# Patient Record
Sex: FEMALE | Race: WHITE | NOT HISPANIC OR LATINO | Employment: OTHER | URBAN - METROPOLITAN AREA
[De-identification: names, ages, dates, MRNs, and addresses within clinical notes are randomized per-mention and may not be internally consistent; named-entity substitution may affect disease eponyms.]

---

## 2017-02-01 ENCOUNTER — GENERIC CONVERSION - ENCOUNTER (OUTPATIENT)
Dept: OTHER | Facility: OTHER | Age: 62
End: 2017-02-01

## 2017-02-02 ENCOUNTER — GENERIC CONVERSION - ENCOUNTER (OUTPATIENT)
Dept: OTHER | Facility: OTHER | Age: 62
End: 2017-02-02

## 2017-02-03 ENCOUNTER — ALLSCRIPTS OFFICE VISIT (OUTPATIENT)
Dept: OTHER | Facility: OTHER | Age: 62
End: 2017-02-03

## 2017-02-14 ENCOUNTER — ANESTHESIA EVENT (OUTPATIENT)
Dept: PERIOP | Facility: HOSPITAL | Age: 62
End: 2017-02-14
Payer: COMMERCIAL

## 2017-02-28 ENCOUNTER — ANESTHESIA (OUTPATIENT)
Dept: PERIOP | Facility: HOSPITAL | Age: 62
End: 2017-02-28
Payer: COMMERCIAL

## 2017-02-28 ENCOUNTER — HOSPITAL ENCOUNTER (OUTPATIENT)
Facility: HOSPITAL | Age: 62
Setting detail: OUTPATIENT SURGERY
Discharge: HOME | End: 2017-02-28
Attending: ORTHOPAEDIC SURGERY | Admitting: ORTHOPAEDIC SURGERY
Payer: COMMERCIAL

## 2017-02-28 VITALS
BODY MASS INDEX: 42.51 KG/M2 | TEMPERATURE: 97.6 F | RESPIRATION RATE: 16 BRPM | DIASTOLIC BLOOD PRESSURE: 69 MMHG | HEART RATE: 90 BPM | WEIGHT: 249 LBS | SYSTOLIC BLOOD PRESSURE: 140 MMHG | HEIGHT: 64 IN | OXYGEN SATURATION: 98 %

## 2017-02-28 DIAGNOSIS — S83.209A ACUTE MENISCAL TEAR OF KNEE, UNSPECIFIED LATERALITY, INITIAL ENCOUNTER: Primary | ICD-10-CM

## 2017-02-28 LAB
ABO GROUP BLD: NORMAL
BLD GP AB SCN SERPL QL: NEGATIVE
INR PPP: 0.97 (ref 0.86–1.16)
PROTHROMBIN TIME: 12.9 SECONDS (ref 12–14.3)
RH BLD: POSITIVE

## 2017-02-28 PROCEDURE — 86850 RBC ANTIBODY SCREEN: CPT | Performed by: ORTHOPAEDIC SURGERY

## 2017-02-28 PROCEDURE — 86900 BLOOD TYPING SEROLOGIC ABO: CPT | Performed by: ORTHOPAEDIC SURGERY

## 2017-02-28 PROCEDURE — 86901 BLOOD TYPING SEROLOGIC RH(D): CPT | Performed by: ORTHOPAEDIC SURGERY

## 2017-02-28 PROCEDURE — 85610 PROTHROMBIN TIME: CPT | Performed by: ORTHOPAEDIC SURGERY

## 2017-02-28 RX ORDER — LIDOCAINE HYDROCHLORIDE 10 MG/ML
INJECTION, SOLUTION INFILTRATION; PERINEURAL AS NEEDED
Status: DISCONTINUED | OUTPATIENT
Start: 2017-02-28 | End: 2017-02-28 | Stop reason: HOSPADM

## 2017-02-28 RX ORDER — PROPOFOL 10 MG/ML
INJECTION, EMULSION INTRAVENOUS AS NEEDED
Status: DISCONTINUED | OUTPATIENT
Start: 2017-02-28 | End: 2017-02-28 | Stop reason: SURG

## 2017-02-28 RX ORDER — LIDOCAINE HYDROCHLORIDE 10 MG/ML
INJECTION, SOLUTION INFILTRATION; PERINEURAL AS NEEDED
Status: DISCONTINUED | OUTPATIENT
Start: 2017-02-28 | End: 2017-02-28 | Stop reason: SURG

## 2017-02-28 RX ORDER — SODIUM CHLORIDE 9 MG/ML
125 INJECTION, SOLUTION INTRAVENOUS CONTINUOUS
Status: DISCONTINUED | OUTPATIENT
Start: 2017-02-28 | End: 2017-02-28 | Stop reason: HOSPADM

## 2017-02-28 RX ORDER — CEFAZOLIN SODIUM 1 G/3ML
INJECTION, POWDER, FOR SOLUTION INTRAMUSCULAR; INTRAVENOUS AS NEEDED
Status: DISCONTINUED | OUTPATIENT
Start: 2017-02-28 | End: 2017-02-28 | Stop reason: SURG

## 2017-02-28 RX ORDER — MIDAZOLAM HYDROCHLORIDE 1 MG/ML
INJECTION INTRAMUSCULAR; INTRAVENOUS AS NEEDED
Status: DISCONTINUED | OUTPATIENT
Start: 2017-02-28 | End: 2017-02-28 | Stop reason: SURG

## 2017-02-28 RX ORDER — LIDOCAINE HYDROCHLORIDE AND EPINEPHRINE 20; 5 MG/ML; UG/ML
INJECTION, SOLUTION EPIDURAL; INFILTRATION; INTRACAUDAL; PERINEURAL AS NEEDED
Status: DISCONTINUED | OUTPATIENT
Start: 2017-02-28 | End: 2017-02-28 | Stop reason: SURG

## 2017-02-28 RX ORDER — ROPIVACAINE HYDROCHLORIDE 5 MG/ML
INJECTION, SOLUTION EPIDURAL; INFILTRATION; PERINEURAL AS NEEDED
Status: DISCONTINUED | OUTPATIENT
Start: 2017-02-28 | End: 2017-02-28 | Stop reason: SURG

## 2017-02-28 RX ORDER — FENTANYL CITRATE 50 UG/ML
INJECTION, SOLUTION INTRAMUSCULAR; INTRAVENOUS AS NEEDED
Status: DISCONTINUED | OUTPATIENT
Start: 2017-02-28 | End: 2017-02-28 | Stop reason: SURG

## 2017-02-28 RX ORDER — ALBUTEROL SULFATE 2.5 MG/3ML
2.5 SOLUTION RESPIRATORY (INHALATION) ONCE AS NEEDED
Status: DISCONTINUED | OUTPATIENT
Start: 2017-02-28 | End: 2017-02-28 | Stop reason: HOSPADM

## 2017-02-28 RX ORDER — HYDROCODONE BITARTRATE AND ACETAMINOPHEN 5; 325 MG/1; MG/1
1 TABLET ORAL EVERY 6 HOURS PRN
Status: DISCONTINUED | OUTPATIENT
Start: 2017-02-28 | End: 2017-02-28 | Stop reason: HOSPADM

## 2017-02-28 RX ORDER — MEPERIDINE HYDROCHLORIDE 50 MG/ML
12.5 INJECTION INTRAMUSCULAR; INTRAVENOUS; SUBCUTANEOUS AS NEEDED
Status: DISCONTINUED | OUTPATIENT
Start: 2017-02-28 | End: 2017-02-28 | Stop reason: HOSPADM

## 2017-02-28 RX ORDER — FENTANYL CITRATE/PF 50 MCG/ML
50 SYRINGE (ML) INJECTION
Status: DISCONTINUED | OUTPATIENT
Start: 2017-02-28 | End: 2017-02-28 | Stop reason: HOSPADM

## 2017-02-28 RX ORDER — BUPIVACAINE HYDROCHLORIDE AND EPINEPHRINE 2.5; 5 MG/ML; UG/ML
INJECTION, SOLUTION INFILTRATION; PERINEURAL AS NEEDED
Status: DISCONTINUED | OUTPATIENT
Start: 2017-02-28 | End: 2017-02-28 | Stop reason: HOSPADM

## 2017-02-28 RX ORDER — ONDANSETRON 2 MG/ML
4 INJECTION INTRAMUSCULAR; INTRAVENOUS EVERY 6 HOURS PRN
Status: DISCONTINUED | OUTPATIENT
Start: 2017-02-28 | End: 2017-02-28 | Stop reason: HOSPADM

## 2017-02-28 RX ORDER — ACETAMINOPHEN 325 MG/1
650 TABLET ORAL EVERY 6 HOURS PRN
Status: DISCONTINUED | OUTPATIENT
Start: 2017-02-28 | End: 2017-02-28 | Stop reason: HOSPADM

## 2017-02-28 RX ORDER — ONDANSETRON 2 MG/ML
INJECTION INTRAMUSCULAR; INTRAVENOUS AS NEEDED
Status: DISCONTINUED | OUTPATIENT
Start: 2017-02-28 | End: 2017-02-28 | Stop reason: SURG

## 2017-02-28 RX ADMIN — LIDOCAINE HYDROCHLORIDE 100 MG: 10 INJECTION, SOLUTION INFILTRATION; PERINEURAL at 07:25

## 2017-02-28 RX ADMIN — CEFAZOLIN 2000 MG: 1 INJECTION, POWDER, FOR SOLUTION INTRAVENOUS at 07:26

## 2017-02-28 RX ADMIN — DEXAMETHASONE SODIUM PHOSPHATE 4 MG: 10 INJECTION INTRAMUSCULAR; INTRAVENOUS at 07:45

## 2017-02-28 RX ADMIN — LIDOCAINE HYDROCHLORIDE AND EPINEPHRINE 20 ML: 20; 5 INJECTION, SOLUTION EPIDURAL; INFILTRATION; INTRACAUDAL; PERINEURAL at 07:15

## 2017-02-28 RX ADMIN — SODIUM CHLORIDE 125 ML/HR: 0.9 INJECTION, SOLUTION INTRAVENOUS at 06:22

## 2017-02-28 RX ADMIN — ROPIVACAINE HYDROCHLORIDE 30 ML: 5 INJECTION, SOLUTION EPIDURAL; INFILTRATION; PERINEURAL at 07:15

## 2017-02-28 RX ADMIN — ONDANSETRON HYDROCHLORIDE 4 MG: 2 INJECTION, SOLUTION INTRAVENOUS at 07:46

## 2017-02-28 RX ADMIN — SODIUM CHLORIDE: 0.9 INJECTION, SOLUTION INTRAVENOUS at 07:32

## 2017-02-28 RX ADMIN — FENTANYL CITRATE 100 MCG: 50 INJECTION, SOLUTION INTRAMUSCULAR; INTRAVENOUS at 07:13

## 2017-02-28 RX ADMIN — MIDAZOLAM HYDROCHLORIDE 2 MG: 1 INJECTION, SOLUTION INTRAMUSCULAR; INTRAVENOUS at 07:13

## 2017-02-28 RX ADMIN — PROPOFOL 200 MG: 10 INJECTION, EMULSION INTRAVENOUS at 07:25

## 2017-03-31 ENCOUNTER — GENERIC CONVERSION - ENCOUNTER (OUTPATIENT)
Dept: OTHER | Facility: OTHER | Age: 62
End: 2017-03-31

## 2018-01-11 NOTE — MISCELLANEOUS
Message   Recorded as Task   Date: 12/28/2016 01:47 PM, Created By: Anastasia Jaramillo   Task Name: Call Back   Assigned To: ELLIE GYN,Team   Regarding Patient: KIET LOPEZ, Status: In Progress   Comment:    Ronak Caaly - 28 Dec 2016 1:47 PM     TASK CREATED  pelvic ultrasound looks okay; endometrium is borderline at 5mm  I cannot be sure that her stopping HRT patches caused her bleed, so she will need an endometrial biopsy   [facility] - 28 Dec 2016 2:14 PM     TASK IN PROGRESS   [facility] - 28 Dec 2016 2:28 PM     TASK EDITED  Pt is in bad shape  She is on Eliquis and is not supposed to stop it  She might have knee surgery and still has the clot  Is going back to vascular doc on 1/27 and wants to wait till after then to have EB  I don't want to scare her - she wants to wait  What say you? Margy Caal - 28 Dec 2016 2:30 PM     TASK REPLIED TO: Previously Assigned To Margy Caal  that's ok   [facility] - 28 Dec 2016 2:49 PM     TASK EDITED   [facility] - 28 Dec 2016 2:59 PM     TASK EDITED  Pt will call abck in Feb  I also will have task return end of Ricardo NavaWaltJie - 31 Jan 2017 8:12 AM     TASK EDITED   [facility] - 01 Feb 2017 8:14 AM     TASK EDITED  [facility] - 01 Feb 2017 8:14 AM     TASK IN PROGRESS   [facility] - 01 Feb 2017 9:04 AM     TASK EDITED  Pt is now going to have knee replacement  But is switching docs so it might not be for a while  Pt on eliquis and will remain on it till ff knee surgery  She has not had any more  bleeding  Can an EB be done when pt on eliquis? What to do???   Margy Caal - 01 Feb 2017 9:57 AM     TASK REPLIED TO: Previously Assigned To Margy Caal  yes it can be done while on eliquis   [facility] - 01 Feb 2017 11:22 AM     TASK EDITED  [facility] - 01 Feb 2017 3:47 PM     TASK EDITED  I gave pt apt for an eb  I gave 40 min for apt as pt has mobility problems          Active Problems    1  Encounter for gynecological examination without abnormal finding (V72 31) (Z01 419)   2  Encounter for screening mammogram for malignant neoplasm of breast (V76 12)   (Z12 31)   3  Hot flashes (782 62) (R23 2)   4  Menopause (627 2)   5  Palpitations (785 1) (R00 2)   6  PMB (postmenopausal bleeding) (627 1) (N95 0)    Current Meds   1  Calcium Citrate + D 315-250 MG-UNIT Oral Tablet; Therapy: (Recorded:15Jan2014) to Recorded   2  Climara Pro 0 045-0 015 MG/DAY Transdermal Patch Weekly; APPLY 1 PATCH WEEKLY   AS DIRECTED; Therapy: 91CAL2312 to ()  Requested for: 25Oct2016; Last   Rx:25Oct2016 Ordered   3  Fish Oil CAPS; Therapy: (Recorded:15Jan2014) to Recorded   4  Multi-Vitamin TABS; Therapy: (Recorded:15Jan2014) to Recorded    Allergies    1  EPINEPHrine SOLN    Signatures   Electronically signed by :  Bernadine Cummins, ; Feb 1 2017  3:48PM EST                       (Author)

## 2018-01-12 NOTE — MISCELLANEOUS
Message  please note patient stated to me initially that she used the patch 2xs weekly for only one week, when I spoke to her again she then said "well the thing is I now need refills because I didn't really only use it for one week, I am a month short of patches-I actually used it for 2 weeks" I advised we will not refill until she comes in for discussion and I absolutely advise she is TO NOT use more than 1 x weekly  pt understood and apt was set up for friday  Active Problems    1  Encounter for gynecological examination without abnormal finding (V72 31) (Z01 419)   2  Encounter for screening mammogram for malignant neoplasm of breast (V76 12)   (Z12 31)   3  Hot flashes (782 62) (R23 2)   4  Menopause (627 2)   5  Palpitations (785 1) (R00 2)    Current Meds   1  Calcium Citrate + D 315-250 MG-UNIT Oral Tablet; Therapy: (Recorded:15Jan2014) to Recorded   2  Climara Pro 0 045-0 015 MG/DAY Transdermal Patch Weekly; APPLY 1 PATCH WEEKLY   AS DIRECTED; Therapy: 93FXD1463 to (96 [id number])  Requested for: 25Oct2016; Last   Rx:25Oct2016 Ordered   3  CombiPatch 0 05-0 14 MG/DAY Transdermal Patch Twice Weekly; one patch twice   weekly; Therapy: 63NIO8062 to (Evaluate:16Oct2017)  Requested for: 21Oct2016; Last   Rx:21Oct2016 Ordered   4  CombiPatch 0 05-0 14 MG/DAY Transdermal Patch Twice Weekly; USE AS DIRECTED; Therapy: 23ERG1199 to (Christine Iqbal)  Requested for: 07YQI9823; Last   Rx:11Nov2016 Ordered   5  Fish Oil CAPS; Therapy: (Recorded:15Jan2014) to Recorded   6  Multi-Vitamin TABS; Therapy: (Recorded:15Jan2014) to Recorded    Allergies    1  EPINEPHrine SOLN    Plan  Palpitations    · (1) TSH; Status:Active - Retrospective By Protocol Authorization;  Requested  for:79Gtx5804;     Signatures   Electronically signed by : Rena Gabriel LPN; Dec  2 7905  5:46VX EST                       (Author)

## 2018-01-12 NOTE — MISCELLANEOUS
Message   Recorded as Task   Date: 11/11/2016 12:26 PM, Created By: Junito Lewis   Task Name: Call Back   Assigned To: ELLIE GYN,Team   Regarding Patient: KIET LOPEZ, Status: In Progress   Corazon Barrett - 11 Nov 2016 12:26 PM     TASK CREATED  pt called c/o the hormone patch that she is on isn't working  [address] [employer] 11 Nov 2016 12:48 PM     TASK IN PROGRESS   MikiJan - 11 Nov 2016 12:58 PM     TASK EDITED              returned pts' p c - states "climiara patch is not working for her" her chief complaint is heart palpatations & hot flashes"  is there something else she can use? please advise        Active Problems    1  Encounter for gynecological examination without abnormal finding (V72 31) (Z01 419)   2  Encounter for screening mammogram for malignant neoplasm of breast (V76 12)   (Z12 31)   3  Hot flashes (782 62) (R23 2)   4  Menopause (627 2)   5  Palpitations (785 1) (R00 2)    Current Meds   1  Calcium Citrate + D 315-250 MG-UNIT Oral Tablet; Therapy: (Recorded:15Jan2014) to Recorded   2  Climara Pro 0 045-0 015 MG/DAY Transdermal Patch Weekly; APPLY 1 PATCH WEEKLY   AS DIRECTED; Therapy: 94OES3667 to (21 )  Requested for: 25Oct2016; Last   Rx:25Oct2016 Ordered   3  CombiPatch 0 05-0 14 MG/DAY Transdermal Patch Twice Weekly; one patch twice   weekly; Therapy: 47WQV3288 to (Evaluate:16Oct2017)  Requested for: 21Oct2016; Last   Rx:21Oct2016 Ordered   4  Fish Oil CAPS; Therapy: (Recorded:15Jan2014) to Recorded   5  Multi-Vitamin TABS; Therapy: (Recorded:15Jan2014) to Recorded    Allergies    1   EPINEPHrine SOLN    Signatures   Electronically signed by : Makayla Shoemaker RN; Nov 11 2016 12:58PM EST                       (Author)

## 2018-01-12 NOTE — MISCELLANEOUS
Message   Recorded as Task   Date: 12/08/2016 12:10 PM, Created By: Awais Colon   Task Name: Call Back   Assigned To: ELLIE GYN,Team   Regarding Patient: KIET LOPEZ, Status: In Progress   Logan Centeno - 08 Dec 2016 12:10 PM     TASK CREATED  Caller: Self; [phone] (Home); [phone] (Work)  pt called - she said she had to cancel her appt for tomorrow with ann and she would like for letty to call her back  [phone]   Jie Moore - 08 Dec 2016 1:27 PM     TASK IN PROGRESS   Jie Moore - 08 Dec 2016 1:44 PM     TASK EDITED  spoke with pt    she wants ann to [facility] that she fell down her steps, and iis in a wheelchair with her knee stabalized    she is out of work for 1 month    she is stilll complaining that   climara does not give her medication for the full week    adv pt to use the climara for a month and resched apt with ann when she has recovered    still awaiting tsh result which she just ahd done at [facility] lab    she will call and have result faxed  fyi to ann        Active Problems    1  Encounter for gynecological examination without abnormal finding (V72 31) (Z01 419)   2  Encounter for screening mammogram for malignant neoplasm of breast (V76 12)   (Z12 31)   3  Hot flashes (782 62) (R23 2)   4  Menopause (627 2)   5  Palpitations (785 1) (R00 2)    Current Meds   1  Calcium Citrate + D 315-250 MG-UNIT Oral Tablet; Therapy: (Recorded:15Jan2014) to Recorded   2  Climara Pro 0 045-0 015 MG/DAY Transdermal Patch Weekly; APPLY 1 PATCH WEEKLY   AS DIRECTED; Therapy: 56SGI1945 to (96 [id number])  Requested for: 25Oct2016; Last   Rx:25Oct2016 Ordered   3  CombiPatch 0 05-0 14 MG/DAY Transdermal Patch Twice Weekly; one patch twice   weekly; Therapy: 43DWB6316 to (Evaluate:16Oct2017)  Requested for: 21Oct2016; Last   Rx:21Oct2016 Ordered   4  CombiPatch 0 05-0 14 MG/DAY Transdermal Patch Twice Weekly; USE AS DIRECTED;    Therapy: 47RVW6967 to (Bartolo Gtz)  Requested for: 69IDV7219; Last   Rx:11Nov2016 Ordered   5  Fish Oil CAPS; Therapy: (Recorded:15Jan2014) to Recorded   6  Multi-Vitamin TABS; Therapy: (Recorded:15Jan2014) to Recorded    Allergies    1   EPINEPHrine SOLN    Signatures   Electronically signed by : Magdalene Oates, ; Dec  8 2016  1:44PM EST                       (Author)

## 2018-01-13 NOTE — MISCELLANEOUS
Message   Recorded as Task   Date: 10/21/2016 01:40 PM, Created By: Daisy Bee   Task Name: Follow Up   Assigned To: Valente Henry   Regarding Patient: KIET LOPEZ, Status: In Progress   Comment:    Daisy Bee - 21 Oct 2016 1:40 PM     TASK CREATED  Caller: Self; [phone] (Home); [phone] (Work)  recvd notification that the combipatch is backordered,is there another rx we can send in for the pt to [facility]? Alina Rankin - 21 Oct 2016 1:48 PM     TASK IN PROGRESS   Alina Rankin - 21 Oct 2016 1:49 PM     TASK REPLIED TO: Previously Assigned To [address] [place] [address]? Margy Caal - 21 Oct 2016 1:59 PM     TASK REPLIED TO: Previously Assigned To Jordan Cooley pro would be very similar but not sure if more expensive - is a once weekly patch   Alina Rankin - 21 Oct 2016 2:08 PM     TASK REASSIGNED: Previously Assigned To ELLIE GYN,Team      climara pro  once weekly patch  might be more expensive, not sure   Daisy Bee - 21 Oct 2016 2:48 PM     TASK EDITED                 sent to pharm        Active Problems    1  Encounter for gynecological examination without abnormal finding (V72 31) (Z01 419)   2  Encounter for screening mammogram for malignant neoplasm of breast (V76 12)   (Z12 31)   3  Hot flashes (782 62) (R23 2)   4  Menopause (627 2)   5  Palpitations (785 1) (R00 2)    Current Meds   1  Calcium Citrate + D 315-250 MG-UNIT Oral Tablet; Therapy: (Recorded:15Jan2014) to Recorded   2  CombiPatch 0 05-0 14 MG/DAY Transdermal Patch Twice Weekly; one patch twice   weekly; Therapy: 54RHZ7632 to (Evaluate:16Oct2017)  Requested for: 21Oct2016; Last   Rx:21Oct2016 Ordered   3  Fish Oil CAPS; Therapy: (Recorded:15Jan2014) to Recorded   4  Multi-Vitamin TABS; Therapy: (Recorded:15Jan2014) to Recorded    Allergies    1   EPINEPHrine SOLN    Plan  Hot flashes, Menopause    · Estradiol 0 05 MG/24HR Transdermal Patch Weekly (Climara); APPLY 1 PATCH  TOPICALLY TO SKIN ONCE WEEKLY    Signatures   Electronically signed by : Sarah Beth Lagos, ; Oct 21 2016  2:48PM EST                       (Author)

## 2018-01-14 VITALS — WEIGHT: 248 LBS | SYSTOLIC BLOOD PRESSURE: 122 MMHG | DIASTOLIC BLOOD PRESSURE: 80 MMHG | BODY MASS INDEX: 41.91 KG/M2

## 2018-01-14 NOTE — MISCELLANEOUS
Message   Recorded as Task   Date: 12/16/2016 04:03 PM, Created By: Jaspreet Starr   Task Name: Med Renewal Request   Assigned To: Freedom Dooley   Regarding Patient: KIET LOPEZ, Status: In Progress   Josephnorma Kirby - 16 Dec 2016 4:03 PM     TASK CREATED  Caller: Self; Renew Medication; [phone] (Home); [phone] (Work)  Rcvd call from mail order, could not make out name of caller  Combipatch is on back order, no release date  Is there something else you would like to prescribe  [phone] reference # [id number]  Jie Moore - 16 Dec 2016 4:05 PM     TASK IN PROGRESS   Jie Moore - 16 Dec 2016 4:05 PM     TASK EDITED  to ann for alternative to combipatch        Active Problems    1  Encounter for gynecological examination without abnormal finding (V72 31) (Z01 419)   2  Encounter for screening mammogram for malignant neoplasm of breast (V76 12)   (Z12 31)   3  Hot flashes (782 62) (R23 2)   4  Menopause (627 2)   5  Palpitations (785 1) (R00 2)   6  PMB (postmenopausal bleeding) (627 1) (N95 0)    Current Meds   1  Calcium Citrate + D 315-250 MG-UNIT Oral Tablet; Therapy: (Recorded:15Jan2014) to Recorded   2  Climara Pro 0 045-0 015 MG/DAY Transdermal Patch Weekly; APPLY 1 PATCH WEEKLY   AS DIRECTED; Therapy: 46DYG2200 to ([phone])  Requested for: 25Oct2016; Last   Rx:25Oct2016 Ordered   3  Fish Oil CAPS; Therapy: (Recorded:15Jan2014) to Recorded   4  Multi-Vitamin TABS; Therapy: (Recorded:15Jan2014) to Recorded    Allergies    1   EPINEPHrine SOLN    Signatures   Electronically signed by : Charla Castelan, ; Dec 16 2016  4:06PM EST                       (Author)

## 2018-01-16 NOTE — MISCELLANEOUS
Message   Recorded as Task   Date: 12/14/2016 11:01 AM, Created By: Christina Bolden   Task Name: Med Renewal Request   Assigned To: Christina Bolden   Regarding Patient: Heriberto Vargas, Status: In Progress   Comment:    Christina Bolden - 14 Dec 2016 11:01 AM     TASK CREATED  Pt is in a wheelchair ( ruptured Baker cyst) and she stopped her estrogen patch few days ago  She is now bleeding  Not sure what f/u  Probably due to stopping estrogen - but?? She is going today for mri on her knee  Will be in wheelchair 1 more week  Margy Caal - 14 Dec 2016 1:40 PM     TASK REPLIED TO: Previously Assigned To Margy Caal  please check pelvic ultrasound to evaluate stripe  Hopefully this is just from stopping patches  How heavy is she bleeding? Would stay off of patches for now  If her bleeding resolves, we will just observe it and if it recurs will need EMB  Christina Bolden - 15 Dec 2016 9:21 AM     TASK IN PROGRESS   Christina Bolden - 15 Dec 2016 9:27 AM     TASK EDITED  Pt had mri of knee and now is going back for u/s of leg   They feel she has a clot  Pt will take care of knee issues first and when things quiet down will go for pelvic u/s  Slip sent to pt   Christina Bolden - 15 Dec 2016 9:29 AM     TASK EDITED        Active Problems    1  Encounter for gynecological examination without abnormal finding (V72 31) (Z01 419)   2  Encounter for screening mammogram for malignant neoplasm of breast (V76 12)   (Z12 31)   3  Hot flashes (782 62) (R23 2)   4  Menopause (627 2)   5  Palpitations (785 1) (R00 2)   6  PMB (postmenopausal bleeding) (627 1) (N95 0)    Current Meds   1  Calcium Citrate + D 315-250 MG-UNIT Oral Tablet; Therapy: (Recorded:15Jan2014) to Recorded   2  Climara Pro 0 045-0 015 MG/DAY Transdermal Patch Weekly; APPLY 1 PATCH WEEKLY   AS DIRECTED; Therapy: 75EFQ3250 to ([phone])  Requested for: 25Oct2016; Last   Rx:25Oct2016 Ordered   3   CombiPatch 0 05-0 14 MG/DAY Transdermal Patch Twice Weekly; one patch twice   weekly; Therapy: 59JWS5344 to (Evaluate:16Oct2017)  Requested for: 21Oct2016; Last   Rx:21Oct2016 Ordered   4  CombiPatch 0 05-0 14 MG/DAY Transdermal Patch Twice Weekly; USE AS DIRECTED; Therapy: 69PUE6714 to (Valeria Draft)  Requested for: 50HIA0884; Last   Rx:11Nov2016 Ordered   5  Fish Oil CAPS; Therapy: (Recorded:15Jan2014) to Recorded   6  Multi-Vitamin TABS; Therapy: (Recorded:15Jan2014) to Recorded    Allergies    1  EPINEPHrine SOLN    Plan  PMB (postmenopausal bleeding)    · * US PELVIS COMPLETE (TRANSABDOMINAL AND TRANSVAGINAL); Status:Hold For -  [employer]; Requested for:99Ubk3063;     Signatures   Electronically signed by :  Julia Cummins, ; Dec 15 2016  9:29AM EST                       (Author)

## 2018-01-17 NOTE — MISCELLANEOUS
Message   Recorded as Task   Date: 02/02/2017 03:33 PM, Created By: Marco Wells   Task Name: Call Back   Assigned To: Bethany Gentile   Regarding Patient: Jordyn Robledo, Status: Active   Comment:    Marco Wells - 09 Feb 2017 3:33 PM     TASK CREATED  Pt said she has a friend who is a PA and she told her she should not have an EB while on eliquis  I did ask you yesterday - but just making sure  I even gave this pt a 40 min apt as she has difficulty in maneuvering  Thanks   Marco Wells - 08 Feb 2017 3:52 PM     TASK EDITED   Marco Wells - 55 Feb 2017 3:55 PM     TASK EDITED  Giselle Ayala spoke with Dr CACERES - It is ok to do EB while on Eliquis  Active Problems    1  Encounter for gynecological examination without abnormal finding (V72 31) (Z01 419)   2  Encounter for screening mammogram for malignant neoplasm of breast (V76 12)   (Z12 31)   3  Hot flashes (782 62) (R23 2)   4  Menopause (627 2)   5  Palpitations (785 1) (R00 2)   6  PMB (postmenopausal bleeding) (627 1) (N95 0)    Current Meds   1  Calcium Citrate + D 315-250 MG-UNIT Oral Tablet; Therapy: (Recorded:15Jan2014) to Recorded   2  Climara Pro 0 045-0 015 MG/DAY Transdermal Patch Weekly; APPLY 1 PATCH WEEKLY   AS DIRECTED; Therapy: 06IJF1363 to ()  Requested for: 99Pmj0694; Last   Rx:71Qdp3938 Ordered   3  Fish Oil CAPS; Therapy: (Recorded:15Jan2014) to Recorded   4  Multi-Vitamin TABS; Therapy: (Recorded:15Jan2014) to Recorded    Allergies    1  EPINEPHrine SOLN    Signatures   Electronically signed by :  Donna Cummins, ; Feb 2 2017  3:56PM EST                       (Author)

## 2018-01-17 NOTE — MISCELLANEOUS
Message   Recorded as Task   Date: 10/21/2016 02:49 PM, Created By: Stacey Donovan   Task Name: Call Back   Assigned To: ELLIE GYN,Team   Regarding Patient: KIET LOPEZ, Status: In Progress   Comment:    IngediannaMargy boyle - 21 Oct 2016 2:49 PM     TASK CREATED  rx sent to me to fill is for climara  pt needs climara pro - she needs estrogen plus progesterone  I voided out the climara  Maryfrlacie Daft - 21 Oct 2016 2:54 PM     TASK IN PROGRESS   Alina Rankin - 21 Oct 2016 2:55 PM     TASK REPLIED TO: Previously Assigned To Esther Codyandria - 21 Oct 2016 2:55 PM     TASK Debara Jevon - 25 Oct 2016 2:01 PM     TASK REACTIVATED   Freda Body - 25 Oct 2016 2:03 PM     TASK EDITED                 pharm called they are still waiting for another rx for the patient call them at [address],ref# [id number]   Oscar,Jie - 25 Oct 2016 2:05 PM     TASK IN [address] [place] [address],[facility] Floor - 25 Oct 2016 2:05 PM     TASK IN [address] [place] [address] Floor - 25 Oct 2016 2:08 PM     TASK EDITED                 rx sent via EHR        Active Problems    1  Encounter for gynecological examination without abnormal finding (V72 31) (Z01 419)   2  Encounter for screening mammogram for malignant neoplasm of breast (V76 12)   (Z12 31)   3  Hot flashes (782 62) (R23 2)   4  Menopause (627 2)   5  Palpitations (785 1) (R00 2)    Current Meds   1  Calcium Citrate + D 315-250 MG-UNIT Oral Tablet; Therapy: (Recorded:15Jan2014) to Recorded   2  CombiPatch 0 05-0 14 MG/DAY Transdermal Patch Twice Weekly; one patch twice   weekly; Therapy: 92ERW7608 to (Evaluate:16Oct2017)  Requested for: 21Oct2016; Last   Rx:21Oct2016 Ordered   3  Fish Oil CAPS; Therapy: (Recorded:15Jan2014) to Recorded   4  Multi-Vitamin TABS; Therapy: (Recorded:15Jan2014) to Recorded    Allergies    1   EPINEPHrine SOLN    Plan  Hot flashes, Menopause    · Climara Pro 0 045-0 015 MG/DAY Transdermal Patch Weekly; APPLY 1 PATCH  WEEKLY AS DIRECTED    Signatures   Electronically signed by : Della Quinones LPN; Oct 25 5039  8:07CB EST                       (Author)

## 2018-01-17 NOTE — MISCELLANEOUS
Message   Recorded as Task   Date: 11/11/2016 12:26 PM, Created By: Enrique Bernard   Task Name: Call Back   Assigned To: Nayan Fee   Regarding Patient: Violetta Zendejas, Status: Active   Comment:    ChuchoAlina - 11 Nov 2016 12:26 PM     TASK CREATED  pt called c/o the hormone patch that she is on isn't working  [address] 11 Nov 2016 12:48 PM     TASK IN PROGRESS   MikiJan - 11 Nov 2016 12:58 PM     TASK EDITED              returned pts' p c - states "climiara patch is not working for her" her chief complaint is heart palpatations & hot flashes"  is there something else she can use? please advise   MikiJan - 11 Nov 2016 12:59 PM     TASK REASSIGNED: Previously Assigned To Apoorva Monte - 11 Nov 2016 1:12 PM     TASK REPLIED TO: Previously Assigned To Margy Caal  is combipatch back in stock? She had been doing well on that  If not, there are no other patches available - will need to change to another methoid  Should come in for a consult because there are so many different options for this  MikiJan - 11 Nov 2016 2:32 PM     TASK EDITED    p lc  to pharmacy- was redirected to [facility] Rx home oliver  - combipatch is avail  & covered for pt   ordered through pharm  90 day supply with 3 r/f's   pt informed     MikiJan - 11 Nov 2016 2:32 PM     TASK REASSIGNED: Previously Assigned To [facility] - 11 Nov 2016 2:32 PM     TASK Vemignon Garcia - 18 Nov 2016 11:47 AM     TASK REACTIVATED   Daisy Dress - 18 Nov 2016 11:48 AM     TASK EDITED                 [facility] called combi patch is on back order until dec 9th or so,lft mesage for pt to wither wait for that or come in for ovl for other alternitives as wl suggested   Daisy Dress - 18 Nov 2016 11:48 AM     TASK COMPLETED   Jessenia Sexton - 02 Dec 2016 11:42 AM     TASK REACTIVATED  pt states climara patch is not working-she started doing the patch 2xs weekly this past week, states she notcies the difference and the sx are better when she changes the patch 2xs weekly  however she still has "mild heart palpations"-when fresh patch is put on, the pharmacy suggested her maybe trying the 0 075/0 25 climara instead once weekly, they also advised maybe annette? please advise, pt cannot come into ov due to no insurance, please advise  I did advise she only use it once a week as directed until we get back to her  Margy Caal - 70 Dec 2016 12:56 PM     TASK REPLIED TO: Previously Assigned To Margy Caal  She absolutely can only use one patch per week  There is no higher dose of Climara Pro - it only comes in one dose  Please be sure that she is actually taking the Climara Pro - she has a uterus! [facility] is estrogen only, so would need to take a progesterone pill if we are going that route  In the past I suggested that if this patch wasn't working she would need to come in for a consult - there are too many possible options on how to manage this in that case  I'm sorry that she has no insurance but this needs to be a 40 minute visit to discuss HRT options  Sydney Pineda - 39 Dec 2016 1:30 PM     TASK EDITED  made patient aware, wanted hormone testing done first befoer OV  [facility]  apt scheduled to discuss for friday  Active Problems    1  Encounter for gynecological examination without abnormal finding (V72 31) (Z01 419)   2  Encounter for screening mammogram for malignant neoplasm of breast (V76 12)   (Z12 31)   3  Hot flashes (782 62) (R23 2)   4  Menopause (627 2)   5  Palpitations (785 1) (R00 2)    Current Meds   1  Calcium Citrate + D 315-250 MG-UNIT Oral Tablet; Therapy: (Recorded:15Jan2014) to Recorded   2  Climara Pro 0 045-0 015 MG/DAY Transdermal Patch Weekly; APPLY 1 PATCH WEEKLY   AS DIRECTED; Therapy: 53MPP8543 to ()  Requested for: 25Oct2016; Last   Rx:25Oct2016 Ordered   3   CombiPatch 0 05-0 14 MG/DAY Transdermal Patch Twice Weekly; one patch twice   weekly; Therapy: 17QGD7957 to (Evaluate:16Oct2017)  Requested for: 21Oct2016; Last   Rx:21Oct2016 Ordered   4  CombiPatch 0 05-0 14 MG/DAY Transdermal Patch Twice Weekly; USE AS DIRECTED; Therapy: 29QVD9166 to ([place] Laughter)  Requested for: 13UBX0787; Last   Rx:11Nov2016 Ordered   5  Fish Oil CAPS; Therapy: (Recorded:15Jan2014) to Recorded   6  Multi-Vitamin TABS; Therapy: (Recorded:15Jan2014) to Recorded    Allergies    1   EPINEPHrine SOLN    Signatures   Electronically signed by : Rena Gabriel LPN; Dec  2 3138  3:46DD EST                       (Author)

## 2018-01-18 NOTE — MISCELLANEOUS
Message   Recorded as Task   Date: 12/08/2016 12:10 PM, Created By: Ariel Sigala   Task Name: Call Back   Assigned To: ELLIE GYN,Team   Regarding Patient: KIET LOPEZ, Status: In Progress   Joel Villarsaray - 08 Dec 2016 12:10 PM     TASK CREATED  Caller: Self; [phone] (Home); [phone] (Work)  pt called - she said she had to cancel her appt for tomorrow with ann and she would like for letty to call her back  [phone]   Jie Moore - 08 Dec 2016 1:27 PM     TASK IN PROGRESS   Jie Moore - 08 Dec 2016 1:44 PM     TASK EDITED  spoke with pt    she wants ann to [facility] that she fell down her steps, and iis in a wheelchair with her knee stabalized    she is out of work for 1 month    she is stilll complaining that   climara does not give her medication for the full week    adv pt to use the climara for a month and resched apt with ann when she has recovered    still awaiting tsh result which she just ahd done at [facility] lab    she will call and have result faxed  fyi to ann   [facility] - 08 Dec 2016 1:45 PM     TASK REASSIGNED: Previously Assigned To Conner Mccabe - 08 Dec 2016 2:09 PM     TASK REPLIED TO: Previously Assigned To Ann Caal  sitting in a wheelchair significantly increases her risk of a blood clot, and adding hormones to the mix increases that risk even more  I do not think hormones are advisable if she is wheelchair bound  With that being said, she can come in for a consult regarding options when she is able  She is allowed to come here in a wheelchair, so she can be seen whenever she can get here  Jie Moore - 08 Dec 2016 3:22 PM     TASK EDITED  pt informed of ann's recommendations    she will not begin patches again befor has apt with ann        Active Problems    1  Encounter for gynecological examination without abnormal finding (V72 31) (Z01 419)   2   Encounter for screening mammogram for malignant neoplasm of breast (V76 12)   (Z12 31)   3  Hot flashes (782 62) (R23 2)   4  Menopause (627 2)   5  Palpitations (785 1) (R00 2)    Current Meds   1  Calcium Citrate + D 315-250 MG-UNIT Oral Tablet; Therapy: (Recorded:15Jan2014) to Recorded   2  Climara Pro 0 045-0 015 MG/DAY Transdermal Patch Weekly; APPLY 1 PATCH WEEKLY   AS DIRECTED; Therapy: [id number] to ()  Requested for: 25Oct2016; Last   Rx:25Oct2016 Ordered   3  CombiPatch 0 05-0 14 MG/DAY Transdermal Patch Twice Weekly; one patch twice   weekly; Therapy: [id number] to (Evaluate:16Oct2017)  Requested for: 21Oct2016; Last   Rx:21Oct2016 Ordered   4  CombiPatch 0 05-0 14 MG/DAY Transdermal Patch Twice Weekly; USE AS DIRECTED; Therapy: [id number] to ([place] [employer])  Requested for: [id number]; Last   Rx:11Nov2016 Ordered   5  Fish Oil CAPS; Therapy: (Recorded:15Jan2014) to Recorded   6  Multi-Vitamin TABS; Therapy: (Recorded:15Jan2014) to Recorded    Allergies    1   EPINEPHrine SOLN    Signatures   Electronically signed by : Misty Borja, ; Dec  8 2016  3:22PM EST                       (Author)

## 2018-01-18 NOTE — MISCELLANEOUS
Message   Recorded as Task   Date: 11/11/2016 12:26 PM, Created By: Sonali Fields   Task Name: Call Back   Assigned To: ELLIE GYN,Team   Regarding Patient: KIET LOPEZ, Status: In Progress   Brenda Quiroz - 11 Nov 2016 12:26 PM     TASK CREATED  pt called c/o the hormone patch that she is on isn't working  [employer] 11 Nov 2016 12:48 PM     TASK IN PROGRESS   MikiJan - 11 Nov 2016 12:58 PM     TASK EDITED              returned pts' p c - states "climiara patch is not working for her" her chief complaint is heart palpatations & hot flashes"  is there something else she can use? please advise   MikiJan - 11 Nov 2016 12:59 PM     TASK REASSIGNED: Previously Assigned To Su Postin - 11 Nov 2016 1:12 PM     TASK REPLIED TO: Previously Assigned To Margy Caal  is combipatch back in stock? She had been doing well on that  If not, there are no other patches available - will need to change to another methoid  Should come in for a consult because there are so many different options for this  MikiJan - 11 Nov 2016 2:32 PM     TASK EDITED    p lc  to pharmacy- was redirected to [facility] home [facility]  - combipatch is avail  & covered for pt   ordered through pharm  90 day supply with 3 r/f's   pt informed  Active Problems    1  Encounter for gynecological examination without abnormal finding (V72 31) (Z01 419)   2  Encounter for screening mammogram for malignant neoplasm of breast (V76 12)   (Z12 31)   3  Hot flashes (782 62) (R23 2)   4  Menopause (627 2)   5  Palpitations (785 1) (R00 2)    Current Meds   1  Calcium Citrate + D 315-250 MG-UNIT Oral Tablet; Therapy: (Recorded:15Jan2014) to Recorded   2  Climara Pro 0 045-0 015 MG/DAY Transdermal Patch Weekly; APPLY 1 PATCH WEEKLY   AS DIRECTED; Therapy: [id number] to (03 [phone] 53)  Requested for: 25Oct2016; Last   Rx:25Oct2016 Ordered   3   CombiPatch 0 05-0 14 MG/DAY Transdermal Patch Twice

## 2018-01-18 NOTE — MISCELLANEOUS
Message   Recorded as Task   Date: 10/26/2016 05:03 PM, Created By: Maximino Wilkes   Task Name: Med Renewal Request   Assigned To: Brian Lee   Regarding Patient: KIET LOPEZ, Status: In Progress   Comment:    Maximino Wilkes - 26 Oct 2016 5:03 PM     TASK CREATED  Caller: Self; Renew Medication; [phone] (Home)  pt called - rx (combi patch) to [employer] [facility] & she called them to find out where it is - they mentioned to her that they have been calling here - it is on back order & they want to know is there something else to rx for her - she said she really needs this rx - please advise  she can be reached at [phone] before 1030am tomorrow (10/27)  Keisha Obdulio - 27 Oct 2016 8:22 AM     TASK IN PROGRESS   Keisha Obdulio - 27 Oct 2016 8:26 AM     TASK EDITED                 made pt aware [employer] is processing request at this time- I called this morning to verify  Active Problems    1  Encounter for gynecological examination without abnormal finding (V72 31) (Z01 419)   2  Encounter for screening mammogram for malignant neoplasm of breast (V76 12)   (Z12 31)   3  Hot flashes (782 62) (R23 2)   4  Menopause (627 2)   5  Palpitations (785 1) (R00 2)    Current Meds   1  Calcium Citrate + D 315-250 MG-UNIT Oral Tablet; Therapy: (Recorded:15Jan2014) to Recorded   2  Climara Pro 0 045-0 015 MG/DAY Transdermal Patch Weekly; APPLY 1 PATCH WEEKLY   AS DIRECTED; Therapy: [id number] to (96 [phone])  Requested for: 25Oct2016; Last   Rx:25Oct2016 Ordered   3  CombiPatch 0 05-0 14 MG/DAY Transdermal Patch Twice Weekly; one patch twice   weekly; Therapy: [id number] to (Evaluate:16Oct2017)  Requested for: 21Oct2016; Last   Rx:21Oct2016 Ordered   4  Fish Oil CAPS; Therapy: (Recorded:15Jan2014) to Recorded   5  Multi-Vitamin TABS; Therapy: (Recorded:15Jan2014) to Recorded    Allergies    1   EPINEPHrine SOLN    Signatures   Electronically signed by : Linnette Venegas LPN; Oct 27 6643  9:86CH EST                       (Author)

## 2018-01-18 NOTE — MISCELLANEOUS
Message   Recorded as Task   Date: 10/26/2016 05:03 PM, Created By: Meli Hyman   Task Name: Med Renewal Request   Assigned To: Umberto Lubin   Regarding Patient: KIET LOPEZ, Status: In Progress   Comment:    Meli Hyman - 26 Oct 2016 5:03 PM     TASK CREATED  Caller: Self; Renew Medication; [phone] (Home)  pt called - rx (combi patch) to [employer] [facility] & she called them to find out where it is - they mentioned to her that they have been calling here - it is on back order & they want to know is there something else to rx for her - she said she really needs this rx - please advise  she can be reached at [phone] before 1030am tomorrow (10/27)  Bula Gaucher - 27 Oct 2016 8:22 AM     TASK IN PROGRESS   Angelfam Gaucher - 27 Oct 2016 8:26 AM     TASK EDITED                 made pt aware [employer] is processing request at this time- I called this morning to verify  Active Problems    1  Encounter for gynecological examination without abnormal finding (V72 31) (Z01 419)   2  Encounter for screening mammogram for malignant neoplasm of breast (V76 12)   (Z12 31)   3  Hot flashes (782 62) (R23 2)   4  Menopause (627 2)   5  Palpitations (785 1) (R00 2)    Current Meds   1  Calcium Citrate + D 315-250 MG-UNIT Oral Tablet; Therapy: (Recorded:15Jan2014) to Recorded   2  Climara Pro 0 045-0 015 MG/DAY Transdermal Patch Weekly; APPLY 1 PATCH WEEKLY   AS DIRECTED; Therapy: [id number] to ()  Requested for: 25Oct2016; Last   Rx:25Oct2016 Ordered   3  CombiPatch 0 05-0 14 MG/DAY Transdermal Patch Twice Weekly; one patch twice   weekly; Therapy: [id number] to (Evaluate:16Oct2017)  Requested for: 21Oct2016; Last   Rx:21Oct2016 Ordered   4  Fish Oil CAPS; Therapy: (Recorded:15Jan2014) to Recorded   5  Multi-Vitamin TABS; Therapy: (Recorded:15Jan2014) to Recorded    Allergies    1   EPINEPHrine SOLN    Signatures   Electronically signed by : Teresa Arrieta LPN; Oct 27 6779  6:08FT EST                       (Author)

## 2018-04-16 ENCOUNTER — TELEPHONE (OUTPATIENT)
Dept: OBGYN CLINIC | Facility: CLINIC | Age: 63
End: 2018-04-16

## 2018-04-16 NOTE — TELEPHONE ENCOUNTER
----- Message from Ana Farmer PA-C sent at 4/16/2018 12:11 PM EDT -----  Patient needs additional views for her mammo  Please be sure she is scheduled and send slip   thx

## 2018-04-16 NOTE — TELEPHONE ENCOUNTER
I spoke with patient, she is already scheduled for Friday  She is going to [facility] and was told she did not need an order

## 2018-05-08 ENCOUNTER — TELEPHONE (OUTPATIENT)
Dept: OBGYN CLINIC | Facility: CLINIC | Age: 63
End: 2018-05-08

## 2018-05-08 DIAGNOSIS — R92.8 ABNORMAL MAMMOGRAM: Primary | ICD-10-CM

## 2018-05-08 NOTE — TELEPHONE ENCOUNTER
Spoke with pt   awaiting results from [address] from [facility]    Pt wc 05/09 with fax number to send asap

## 2018-05-09 ENCOUNTER — TELEPHONE (OUTPATIENT)
Dept: OBGYN CLINIC | Facility: CLINIC | Age: 63
End: 2018-05-09

## 2018-05-09 DIAGNOSIS — R92.8 ABNORMAL MAMMOGRAM: Primary | ICD-10-CM

## 2018-05-09 DIAGNOSIS — R92.2 DENSE BREAST TISSUE: ICD-10-CM

## 2018-05-09 NOTE — TELEPHONE ENCOUNTER
----- Message from Alex Jaramillo PA-C sent at 5/9/2018  2:26 PM EDT -----  Regarding: ultrasound results  Please let Jamal Mae know that her ultrasound showed some fibrocystic tissue in the area of abnormality seen on her mammogram - recommendation is for six month follow-up ultrasound to be sure the area is stable   All of these results are available on Care Everywhere

## 2018-05-10 NOTE — TELEPHONE ENCOUNTER
Patient returned call - she is aware of u/s results and that she is recommended to have a 6 month f/u u/s and left diagnostic mammo  Mailed orders to patient  She already has the appointment scheduled

## 2020-03-02 ENCOUNTER — TELEPHONE (OUTPATIENT)
Dept: OBGYN CLINIC | Facility: CLINIC | Age: 65
End: 2020-03-02

## 2020-03-02 NOTE — TELEPHONE ENCOUNTER
----- Message from Patrick Rainey PA-C sent at 3/2/2020  1:25 PM EST -----  I'm not sure why, but Mayra's name just popped into my head  I see she is overdue for her yearly exam  Could you please call her and help her make an appointment for this? Thanks!   Yeimy Roberson

## 2020-09-07 ENCOUNTER — APPOINTMENT (EMERGENCY)
Dept: RADIOLOGY | Facility: HOSPITAL | Age: 65
End: 2020-09-07
Payer: MEDICARE

## 2020-09-07 ENCOUNTER — HOSPITAL ENCOUNTER (EMERGENCY)
Facility: HOSPITAL | Age: 65
Discharge: HOME | End: 2020-09-07
Attending: EMERGENCY MEDICINE
Payer: MEDICARE

## 2020-09-07 VITALS
SYSTOLIC BLOOD PRESSURE: 154 MMHG | TEMPERATURE: 99 F | HEART RATE: 91 BPM | RESPIRATION RATE: 20 BRPM | DIASTOLIC BLOOD PRESSURE: 92 MMHG | OXYGEN SATURATION: 98 %

## 2020-09-07 DIAGNOSIS — S52.572A OTHER CLOSED INTRA-ARTICULAR FRACTURE OF DISTAL END OF LEFT RADIUS, INITIAL ENCOUNTER: Primary | ICD-10-CM

## 2020-09-07 PROCEDURE — 99283 EMERGENCY DEPT VISIT LOW MDM: CPT

## 2020-09-07 PROCEDURE — 73110 X-RAY EXAM OF WRIST: CPT

## 2020-09-07 PROCEDURE — 99284 EMERGENCY DEPT VISIT MOD MDM: CPT | Performed by: EMERGENCY MEDICINE

## 2020-09-07 RX ORDER — TRAMADOL HYDROCHLORIDE 50 MG/1
50 TABLET ORAL ONCE
Status: COMPLETED | OUTPATIENT
Start: 2020-09-07 | End: 2020-09-07

## 2020-09-07 RX ORDER — ACETAMINOPHEN 325 MG/1
975 TABLET ORAL ONCE
Status: COMPLETED | OUTPATIENT
Start: 2020-09-07 | End: 2020-09-07

## 2020-09-07 RX ADMIN — ACETAMINOPHEN 975 MG: 325 TABLET, FILM COATED ORAL at 16:32

## 2020-09-07 RX ADMIN — TRAMADOL HYDROCHLORIDE 50 MG: 50 TABLET, FILM COATED ORAL at 16:32

## 2020-09-07 NOTE — ED PROVIDER NOTES
History  Chief Complaint   Patient presents with    Wrist Injury     PT presents to ED wtih injury and swelling to the left wrist r/t a fall while carring boxes  PT denies striking head       History provided by:  Patient and spouse  Fall   Mechanism of injury: fall    Injury location:  Shoulder/arm  Shoulder/arm injury location:  L wrist  Incident location:  Home  Time since incident:  1 hour  Arrived directly from scene: yes    Fall:     Fall occurred:  Tripped and walking    Point of impact:  Outstretched arms  Protective equipment: none    Suspicion of alcohol use: no    Suspicion of drug use: no    Tetanus status:  Up to date  Prior to arrival data:     Bystander interventions:  None    Patient ambulatory at scene: yes      Blood loss:  None    Responsiveness at scene:  Alert    Orientation at scene:  Person, place, situation and time    Loss of consciousness: no (No head injury, patient complaining of isolated left wrist pain)      Amnesic to event: no      Breathing interventions:  None    Cardiac interventions:  None    Medications administered:  None    Immobilization:  None  Current pain details:     Pain quality:  Aching    Pain Severity:  Moderate    Pain timing:  Constant  Associated symptoms: no abdominal pain, no back pain, no chest pain, no headaches, no nausea, no neck pain and no vomiting    Associated symptoms comment:  Left wrist deformity, no distal numbness or weakness  Risk factors: no anticoagulation therapy        Prior to Admission Medications   Prescriptions Last Dose Informant Patient Reported? Taking?    Calcium Citrate-Vitamin D (CITRACAL + D PO)   Yes No   Sig: Take by mouth daily   Coenzyme Q10 (COQ10) 100 MG CAPS   Yes No   Sig: Take by mouth daily   Multiple Vitamins-Minerals (CENTRUM PO)   Yes No   Sig: Take by mouth daily   Omega-3 Fatty Acids (FISH OIL) 1,000 mg   Yes No   Sig: Take 1,000 mg by mouth daily   apixaban (ELIQUIS) 5 mg Not Taking at Unknown time  No No   Sig: Take 1 tablet by mouth 2 (two) times a day   Patient not taking: Reported on 9/7/2020   lisinopril (ZESTRIL) 10 mg tablet   Yes No   Sig: Take 10 mg by mouth daily      Facility-Administered Medications: None       Past Medical History:   Diagnosis Date    Arthritis     Bulging lumbar disc     History of blood clots     "behind right knee"    History of palpitations     History of pneumonia     "several times"    Hypertension     Low back pain     Neuropathy     peripheral    Use of cane as ambulatory aid     Wears glasses        Past Surgical History:   Procedure Laterality Date    KNEE ARTHROSCOPY Left     meniscal repair 2011    KS KNEE SCOPE,SHAVE ARTICULAR CART Right 2/28/2017    Procedure: ARTHROSCOPY KNEE WITH PARTIAL MEDIAL MENISECTOMY;  Surgeon: Edgardo Coulter MD;  Location: [facility];  Service: Orthopedics    TONSILLECTOMY      WISDOM TOOTH EXTRACTION         History reviewed  No pertinent family history  I have reviewed and agree with the history as documented  E-Cigarette/Vaping    E-Cigarette Use Never User      E-Cigarette/Vaping Substances     Social History     Tobacco Use    Smoking status: Never Smoker    Smokeless tobacco: Never Used   Substance Use Topics    Alcohol use: No    Drug use: No       Review of Systems   Constitutional: Negative for activity change, chills, diaphoresis and fever  HENT: Negative for congestion, sinus pressure and sore throat  Eyes: Negative for pain and visual disturbance  Respiratory: Negative for cough, chest tightness, shortness of breath, wheezing and stridor  Cardiovascular: Negative for chest pain and palpitations  Gastrointestinal: Negative for abdominal distention, abdominal pain, constipation, diarrhea, nausea and vomiting  Genitourinary: Negative for dysuria and frequency  Musculoskeletal: Negative for back pain, neck pain and neck stiffness  Skin: Negative for rash     Neurological: Negative for dizziness, speech difficulty, light-headedness, numbness and headaches  Physical Exam  Physical Exam  Vitals signs reviewed  Constitutional:       General: She is not in acute distress  Appearance: She is well-developed  She is not diaphoretic  HENT:      Head: Normocephalic and atraumatic  Right Ear: External ear normal       Left Ear: External ear normal       Nose: Nose normal    Eyes:      General:         Right eye: No discharge  Left eye: No discharge  Pupils: Pupils are equal, round, and reactive to light  Neck:      Musculoskeletal: Normal range of motion and neck supple  Trachea: No tracheal deviation  Cardiovascular:      Rate and Rhythm: Normal rate and regular rhythm  Heart sounds: Normal heart sounds  No murmur  Pulmonary:      Effort: Pulmonary effort is normal  No respiratory distress  Breath sounds: Normal breath sounds  No stridor  Abdominal:      General: There is no distension  Palpations: Abdomen is soft  Tenderness: There is no abdominal tenderness  There is no guarding or rebound  Musculoskeletal: Normal range of motion  General: Deformity ( Left wrist deformity and tenderness, high concern for displaced distal radius fracture) present  Skin:     General: Skin is warm and dry  Coloration: Skin is not pale  Findings: No erythema  Neurological:      General: No focal deficit present  Mental Status: She is alert and oriented to person, place, and time           Vital Signs  ED Triage Vitals [09/07/20 1612]   Temperature Pulse Respirations Blood Pressure SpO2   99 °F (37 2 °C) 91 20 154/92 98 %      Temp Source Heart Rate Source Patient Position - Orthostatic VS BP Location FiO2 (%)   Oral Monitor Sitting Right arm --      Pain Score       --           Vitals:    09/07/20 1612   BP: 154/92   Pulse: 91   Patient Position - Orthostatic VS: Sitting         Visual Acuity      ED Medications  Medications   acetaminophen (TYLENOL) tablet 975 mg (975 mg Oral Given 9/7/20 1632)   traMADol (ULTRAM) tablet 50 mg (50 mg Oral Given 9/7/20 1632)       Diagnostic Studies  Results Reviewed     None                 XR wrist 3+ views LEFT   ED Interpretation by Hardik Serna DO (09/07 1622)   Comminuted distal weight all fracture, no significant displacement                 Procedures  Procedures         ED Course            Splint application: left short arm Static Splint was applied, Applied by technician, good position, neurovascular tendon intact, good capillary refill  Evaluated by me prior to discharge                                      MDM  Number of Diagnoses or Management Options  Other closed intra-articular fracture of distal end of left radius, initial encounter: new and requires workup  Diagnosis management comments:       Initial ED assessment:  22-year-old female presents with deformed left wrist after a mechanical fall from standing    Initial DDx includes but is not limited to:   Distal radius fracture, radius ulnar fracture    Initial ED plan:   X-ray,  pain control        Final ED summary/disposition:   After evaluation and workup in the emergency department, thankfully fracture did not need to be reduced, placed in a splint, patient tolerated well, patient discharged        Amount and/or Complexity of Data Reviewed  Tests in the radiology section of CPT®: ordered and reviewed  Decide to obtain previous medical records or to obtain history from someone other than the patient: yes  Obtain history from someone other than the patient: yes  Review and summarize past medical records: yes  Independent visualization of images, tracings, or specimens: yes          Disposition  Final diagnoses:   Other closed intra-articular fracture of distal end of left radius, initial encounter     Time reflects when diagnosis was documented in both MDM as applicable and the Disposition within this note     Time User Action Codes Description Comment    9/7/2020 4:23 PM Jewell Cortes Add [B21 207E] Distal radius fracture     9/7/2020  4:23 PM Raquel ERAZO Remove [S52 509A] Distal radius fracture     9/7/2020  4:23 PM [facility]  Other closed intra-articular fracture of distal end of left radius, initial encounter       ED Disposition     ED Disposition Condition Date/Time Comment    Discharge Stable Mon Sep 7, 2020  4:23 PM [facility] discharge to home/self care  Follow-up Information     Follow up With Specialties Details Why Contact Info Additional [address]  [facility] [place] [facility] [place] [facility] Call today To arrange for the next available appointment  Please explain the you have a wrist fracture [address] [facility] [address] [facility] [place], [address] [place] [address] NEUROREHAB [place], [place], [address]          Discharge Medication List as of 9/7/2020  4:23 PM      CONTINUE these medications which have NOT CHANGED    Details   apixaban (ELIQUIS) 5 mg Take 1 tablet by mouth 2 (two) times a day, Starting 2/28/2017, Until Discontinued, No Print      Calcium Citrate-Vitamin D (CITRACAL + D PO) Take by mouth daily, Until Discontinued, Historical Med      Coenzyme Q10 (COQ10) 100 MG CAPS Take by mouth daily, Until Discontinued, Historical Med      lisinopril (ZESTRIL) 10 mg tablet Take 10 mg by mouth daily, Until Discontinued, Historical Med      Multiple Vitamins-Minerals (CENTRUM PO) Take by mouth daily, Until Discontinued, Historical Med      Omega-3 Fatty Acids (FISH OIL) 1,000 mg Take 1,000 mg by mouth daily, Until Discontinued, Historical Med           No discharge procedures on file      PDMP Review     None          ED Provider  Electronically Signed by           Nella Earl DO  09/07/20 1592

## 2020-09-08 ENCOUNTER — TELEPHONE (OUTPATIENT)
Dept: OBGYN CLINIC | Facility: HOSPITAL | Age: 65
End: 2020-09-08

## 2020-09-08 NOTE — TELEPHONE ENCOUNTER
Spoke to pt  Who was ok with coming in on Thursday to see Elidia Shi  I rescheduled appt  For // at 3:30pm pt  Aware of date , time and place of appt

## 2020-09-08 NOTE — TELEPHONE ENCOUNTER
MRN: [id number]  Patient Name: Tiburcio ARMIJO  O B: 1955  Dept & Loc: Ortho/[facility]  Appt Notes: NP/LT WRIST FX/SLED XRAY/MC   Visit Type: NEW PATIENT  Provider Name: NATALIA VARNER [facility]  Appointment Desired Day/Time: earliest available  Best CB# [phone]    Distal left radius fracture with dorsal angulation  Splinted, not reduced  [address]    Patient opted to be scheduled with Ellie Hale instead of a surgeon  Patient also opted to be scheduled out to Saturday, 09/12/20, due to her daughter having surgery and not knowing when she would be coming home  Patient stated that the ED told her it most likely does not require surgery and she just needs a cast     Please advise

## 2020-09-10 ENCOUNTER — OFFICE VISIT (OUTPATIENT)
Dept: OBGYN CLINIC | Facility: HOSPITAL | Age: 65
End: 2020-09-10
Payer: MEDICARE

## 2020-09-10 VITALS
DIASTOLIC BLOOD PRESSURE: 74 MMHG | HEIGHT: 64 IN | SYSTOLIC BLOOD PRESSURE: 115 MMHG | WEIGHT: 249 LBS | HEART RATE: 90 BPM | BODY MASS INDEX: 42.51 KG/M2

## 2020-09-10 DIAGNOSIS — S52.532A CLOSED COLLES' FRACTURE OF LEFT RADIUS, INITIAL ENCOUNTER: Primary | ICD-10-CM

## 2020-09-10 PROCEDURE — 99203 OFFICE O/P NEW LOW 30 MIN: CPT | Performed by: PHYSICIAN ASSISTANT

## 2020-09-10 PROCEDURE — 29075 APPL CST ELBW FNGR SHORT ARM: CPT | Performed by: PHYSICIAN ASSISTANT

## 2020-09-10 NOTE — PATIENT INSTRUCTIONS
Cast Care   WHAT YOU NEED TO KNOW:   Cast care will help the cast dry and harden correctly, and then protect it until it comes off  Your cast may need up to 48 hours to dry and harden completely  Even after your cast hardens, it can be damaged  DISCHARGE INSTRUCTIONS:   Return to the emergency department if:   · Your cast breaks or gets damaged  · You see drainage, or your cast is stained or smells bad  · Your skin turns blue or pale  · Your skin tingles, burns, or is cold or numb  · You have severe pain that is getting worse and does not go away after you take pain medicine  · Your limb swells, or your cast looks or feels tighter than it was before  Contact your healthcare provider if:   · Something falls into your cast and gets stuck  · You have itching, pain, burning, or weakness where you have the cast      · You have a fever  · You have sores, blisters, or breaks on the skin around the edges of the cast     · You have questions or concerns about your condition or care  Follow up with your healthcare provider as directed: You will need to return to have your cast removed and your bones checked  Write down your questions so you remember to ask them during your visits  Care for your cast while it hardens:   · Protect the cast   Do not put weight on the cast  Do not bend, lean on, or hit the cast with anything  Use the palms of your hands when you move the cast  Do not use your fingers  Your fingers may leave marks on the cast as it dries  · Change positions often  Change your position every 2 hours to help the cast dry faster  Prop your cast on something soft, such as a pillow, to prevent a flat area on your cast      · Keep the cast dry  Tie plastic trash bags around your cast to keep it dry while you bathe  You may use a blow dryer on cool or the lowest heat setting to dry your cast if it gets wet  Do not use a high heat setting, because you may burn your skin   Certain casts can get wet  Ask if you have a waterproof cast   Care for your cast after it hardens:   · Check your cast every day  Contact your healthcare provider if you notice any cracks, dents, holes, or flaking on your cast      · Keep your cast clean and dry  Cover your cast with a towel when you eat  You may have a small piece of cast that can be removed to check on incisions under your cast  Make sure the small piece of cast is kept tightly closed  If your cast gets dirty, use a mild detergent and a damp washcloth to wipe off the outside of your cast  Continue to cover your cast with trash bags to keep it dry while you bathe  · Care for the edges of your cast   Cover the cast edges to keep them smooth  Use 4 inch pieces of waterproof tape  Place one end of the tape under the inside edge of your cast and fold it over to the outside surface  Overlap tape strips until the edges are completely covered  Change the tape as directed  Do not pull or repair any of the padding from inside the cast  This could cause blisters and sores on the skin under your cast      · Keep weight off your cast   Do not let anyone push down or lean on your cast  This may cause it to break  · Do not use sharp objects  Do not use a sharp or pointed object to scratch under your cast  This may cause wounds that can get infected, or you may lose the item inside the cast  If your skin itches, blow cool air under the cast  You may also gently scratch your skin outside the cast with a cloth  © 2017 [employer] Information is for End User's use only and may not be sold, redistributed or otherwise used for commercial purposes  All illustrations and images included in CareNotes® are the copyrighted property of [employer] , [employer]  or [employer]  The above information is an  only  It is not intended as medical advice for individual conditions or treatments   Talk to your doctor, nurse or pharmacist before following any medical regimen to see if it is safe and effective for you

## 2020-09-10 NOTE — PROGRESS NOTES
Assessment/Plan   Diagnoses and all orders for this visit:    Closed Colles' fracture of left radius, initial encounter  - short arm cast  - follow up with hand in a week          Subjective   Patient ID: Amrita Jerome is a 77 y o  female  Vitals:    09/10/20 1527   BP: 115/74   Pulse: 80     66yo female comes in for an evaluation of her left wrist   She was injured on 9-7-20 when she fell while carrying boxes  She landed on her outstretched arm  She went to the ER where xrays showed a colles fracture  This was treated with a splint and her pain is controlled  The pain is dull in character, mild in severity, pain does not radiate and is not associated with numbness  The following portions of the patient's history were reviewed and updated as appropriate: allergies, current medications, past family history, past medical history, past social history, past surgical history and problem list     Review of Systems  Ortho Exam  Past Medical History:   Diagnosis Date    Arthritis     Bulging lumbar disc     History of blood clots     "behind right knee"    History of palpitations     History of pneumonia     "several times"    Hypertension     Low back pain     Neuropathy     peripheral    Use of cane as ambulatory aid     Wears glasses      Past Surgical History:   Procedure Laterality Date    KNEE ARTHROSCOPY Left     meniscal repair 2011    [place] KNEE SCOPE,SHAVE ARTICULAR CART Right 2/28/2017    Procedure: ARTHROSCOPY KNEE WITH PARTIAL MEDIAL MENISECTOMY;  Surgeon: Iam Butler MD;  Location: [facility];  Service: Orthopedics    TONSILLECTOMY      WISDOM TOOTH EXTRACTION       History reviewed  No pertinent family history    Social History     Occupational History    Not on file   Tobacco Use    Smoking status: Never Smoker    Smokeless tobacco: Never Used   Substance and Sexual Activity    Alcohol use: No    Drug use: No    Sexual activity: Not on file       Review of Systems Constitutional: Negative  HENT: Negative  Eyes: Negative  Respiratory: Negative  Cardiovascular: Negative  Gastrointestinal: Negative  Endocrine: Negative  Genitourinary: Negative  Musculoskeletal: As below      Allergic/Immunologic: Negative  Neurological: Negative  Hematological: Negative  Psychiatric/Behavioral: Negative  Objective   Physical Exam      · Constitutional: Awake, Alert, Oriented  · Eyes: EOMI  · Psych: Mood and affect appropriate  · Heart: regular rate and rhythm  · Lungs: No audible wheezing  · Abdomen: soft  · Lymph: no lymphedema   left wrist:  - Appearance   Swelling: mild, no discoloration, no deformity, no ecchymosis and no erythema  - Palpation  o + distal radius tenderness  Otherwise, nontender about the forearm, hand, and fingers   - ROM  o not examined due to fracture status  - Motor  o not examined due to fracture status  - Special Tests  o normal sensation of hand and arm  - NVI distally    I have personally reviewed pertinent films in PACS and my interpretation is minimally angulated colles fracture  Cast application    Date/Time: 9/10/2020 3:44 PM  Performed by: Amanda Benavidez PA-C  Authorized by: Amanda Benavidez PA-C     Consent:     Consent obtained:  Verbal    Consent given by:  Patient    Risks discussed:  Discoloration, numbness, pain and swelling    Alternatives discussed:  No treatment  Pre-procedure details:     Sensation:  Normal  Procedure details:     Laterality:  Left    Location:  Wrist    Wrist:  L wristCast type:  Short arm    Supplies:  Cotton padding and fiberglass  Post-procedure details:     Pain:  Improved    Sensation:  Normal    Patient tolerance of procedure:   Tolerated well, no immediate complications

## 2020-09-17 ENCOUNTER — APPOINTMENT (OUTPATIENT)
Dept: RADIOLOGY | Facility: AMBULARY SURGERY CENTER | Age: 65
End: 2020-09-17
Attending: SURGERY
Payer: MEDICARE

## 2020-09-17 ENCOUNTER — OFFICE VISIT (OUTPATIENT)
Dept: OBGYN CLINIC | Facility: CLINIC | Age: 65
End: 2020-09-17
Payer: MEDICARE

## 2020-09-17 VITALS
HEIGHT: 64 IN | HEART RATE: 90 BPM | BODY MASS INDEX: 41.83 KG/M2 | SYSTOLIC BLOOD PRESSURE: 133 MMHG | WEIGHT: 245 LBS | DIASTOLIC BLOOD PRESSURE: 82 MMHG

## 2020-09-17 DIAGNOSIS — S52.532D CLOSED COLLES' FRACTURE OF LEFT RADIUS WITH ROUTINE HEALING, SUBSEQUENT ENCOUNTER: Primary | ICD-10-CM

## 2020-09-17 DIAGNOSIS — M25.532 PAIN IN LEFT WRIST: ICD-10-CM

## 2020-09-17 PROCEDURE — 99214 OFFICE O/P EST MOD 30 MIN: CPT | Performed by: SURGERY

## 2020-09-17 PROCEDURE — 73110 X-RAY EXAM OF WRIST: CPT

## 2020-09-17 NOTE — PROGRESS NOTES
Loyda ARMIJO  Attending, Orthopaedic Surgery  Hand, Wrist, and Elbow Surgery  [facility]      ORTHOPAEDIC HAND, WRIST, AND ELBOW OFFICE  VISIT       ASSESSMENT/PLAN:      78 yo female with left distal radius fracture, DOI 9/7/2020  Xrays show maintained alignment of the fracture  Surgical and nonoperative treatment was discussed with the patient today  Overall she is in acceptable alignment for nonoperative treatment  She elected to proceed with casting and conservative treatment for the time being as her daughter has Leukemia and requires a good amount of care  She will see us next week for repeat xrays and cast change  The patient verbalized understanding of exam findings and treatment plan  We engaged in the shared decision-making process and treatment options were discussed at length with the patient  Surgical and conservative management discussed today along with risks and benefits  Diagnoses and all orders for this visit:    Closed Colles' fracture of left radius with routine healing, subsequent encounter    Pain in left wrist  -     XR wrist 3+ vw left; Future        Follow Up:  Return in about 1 week (around 9/24/2020) for Recheck  To Do Next Visit:  Re-evaluation of current issue, xrays of the left wrist      General Discussions:  Fracture - Nonoperative Care: The physiology of a fractured bone was discussed with the patient today  With non-displaced or minimally displaced fractures, conservative treatment such as casting or splinting often results in a functional recovery  Typically, these fractures are immobilized in either a cast or splint depending on the pattern  Radiographs are typically taken at intervals throughout the fracture healing to ensure that reduction or alignment is not lost   If the fracture loses its alignment, surgical intervention may be required to stabilize it    Medical conditions such as diabetes, osteoporosis, vitamin D deficiency, and a history of or exposure to smoking may delay or prevent fracture healing  Options between cast/splint immobilization and surgical treatment were offered and the risks and benefits of both were discussed  ____________________________________________________________________________________________________________________________________________      CHIEF COMPLAINT:  Chief Complaint   Patient presents with    Left Wrist - Pain       SUBJECTIVE:  Marvina Hodgkins is a 77y o  year old RHD female who presents for evaluation of a left wrist fracture sustained on 09/07/2020  Patient states she was carrying boxes when she tripped on a step and fell onto her outstretched arm  She went to the ER where she was found to have a left distal radius fracture and was placed in a splint  Patient notes soreness in the wrist but denies any numbness or tingling    No previous injuries to this wrist       Pain/symptom timing:  Worse during the day when active  Pain/symptom context:  Worse with activites and work  Pain/symptom modifying factors:  Rest makes better, activities make worse  Pain/symptom associated signs/symptoms: none    Prior treatment   · NSAIDsYes   · Injections No   · Bracing/Orthotics No    Physical Therapy No     I have personally reviewed all the relevant PMH, PSH, SH, FH, Medications and allergies      PAST MEDICAL HISTORY:  Past Medical History:   Diagnosis Date    Arthritis     Bulging lumbar disc     History of blood clots     "behind right knee"    History of palpitations     History of pneumonia     "several times"    Hypertension     Low back pain     Neuropathy     peripheral    Use of cane as ambulatory aid     Wears glasses        PAST SURGICAL HISTORY:  Past Surgical History:   Procedure Laterality Date    KNEE ARTHROSCOPY Left     meniscal repair 2011    [place] KNEE SCOPE,SHAVE ARTICULAR CART Right 2/28/2017    Procedure: ARTHROSCOPY KNEE WITH PARTIAL MEDIAL MENISECTOMY;  Surgeon: Tanya Barrientos Braeden Foster MD;  Location: [facility];  Service: Orthopedics    TONSILLECTOMY      WISDOM TOOTH EXTRACTION         FAMILY HISTORY:  History reviewed  No pertinent family history  SOCIAL HISTORY:  Social History     Tobacco Use    Smoking status: Never Smoker    Smokeless tobacco: Never Used   Substance Use Topics    Alcohol use: No    Drug use: No       MEDICATIONS:    Current Outpatient Medications:     Calcium Citrate-Vitamin D (CITRACAL + D PO), Take by mouth daily, Disp: , Rfl:     Coenzyme Q10 (COQ10) 100 MG CAPS, Take by mouth daily, Disp: , Rfl:     lisinopril (ZESTRIL) 10 mg tablet, Take 10 mg by mouth daily, Disp: , Rfl:     Multiple Vitamins-Minerals (CENTRUM PO), Take by mouth daily, Disp: , Rfl:     Omega-3 Fatty Acids (FISH OIL) 1,000 mg, Take 1,000 mg by mouth daily, Disp: , Rfl:     apixaban (ELIQUIS) 5 mg, Take 1 tablet by mouth 2 (two) times a day (Patient not taking: Reported on 9/17/2020), Disp: 60 tablet, Rfl: 0    ALLERGIES:  Allergies   Allergen Reactions    Epinephrine Other (See Comments)     "at dentist office ,,heart raced and lightheaded"           REVIEW OF SYSTEMS:  Review of Systems   Constitutional: Negative for chills, diaphoresis, fatigue and fever  HENT: Negative for congestion, facial swelling, hearing loss, sore throat, trouble swallowing and voice change  Respiratory: Negative for apnea, cough, shortness of breath, wheezing and stridor  Cardiovascular: Negative for chest pain, palpitations and leg swelling  Gastrointestinal: Negative for abdominal pain, constipation, nausea and vomiting  Endocrine: Negative for cold intolerance and heat intolerance  Musculoskeletal: Positive for arthralgias and joint swelling  Negative for gait problem and myalgias  Skin: Negative for color change, pallor, rash and wound  Neurological: Negative for tremors, speech difficulty, weakness and numbness     Psychiatric/Behavioral: Negative for agitation, confusion, decreased concentration and hallucinations  The patient is not nervous/anxious  VITALS:  Vitals:    09/17/20 1315   BP: 133/82   Pulse: 90       LABS:  HgA1c: No results found for: HGBA1C  BMP: No results found for: GLUCOSE, CALCIUM, NA, K, CO2, CL, BUN, CREATININE    _____________________________________________________  PHYSICAL EXAMINATION:  General: well developed and well nourished, alert, oriented times 3 and appears comfortable  Psychiatric: Normal  HEENT: Normocephalic, Atraumatic Trachea Midline, No torticollis  Pulmonary: No audible wheezing or respiratory distress   Cardiovascular: No pitting edema, 2+ radial pulse   Skin: No masses, erythema, lacerations, fluctation, ulcerations  Neurovascular: Sensation Intact to the Median, Ulnar, Radial Nerve, Motor Intact to the Median, Ulnar, Radial Nerve and Pulses Intact  Musculoskeletal: Normal, except as noted in detailed exam and in HPI        MUSCULOSKELETAL EXAMINATION:  Left wrist:   Mild edema, no significant ecchymosis  Digital range of motion intact  Cast is clean, dry, intact      ___________________________________________________  STUDIES REVIEWED:  I have personally reviewed AP lateral and oblique radiographs of left wrist which demonstrate intra-articular angulated distal radius fracture, overall radial height is maintained, volar angulation noted           PROCEDURES PERFORMED:  Procedures  No Procedures performed today    _____________________________________________________      Scribe Attestation    I,:   Paul Link PA-C am acting as a scribe while in the presence of the attending physician :        I,:   Laurence Sánchez MD personally performed the services described in this documentation    as scribed in my presence :

## 2020-09-24 ENCOUNTER — APPOINTMENT (OUTPATIENT)
Dept: RADIOLOGY | Facility: AMBULARY SURGERY CENTER | Age: 65
End: 2020-09-24
Attending: SURGERY
Payer: MEDICARE

## 2020-09-24 ENCOUNTER — OFFICE VISIT (OUTPATIENT)
Dept: OBGYN CLINIC | Facility: CLINIC | Age: 65
End: 2020-09-24
Payer: MEDICARE

## 2020-09-24 VITALS
HEART RATE: 94 BPM | TEMPERATURE: 98.4 F | BODY MASS INDEX: 41.83 KG/M2 | WEIGHT: 245 LBS | HEIGHT: 64 IN | DIASTOLIC BLOOD PRESSURE: 81 MMHG | SYSTOLIC BLOOD PRESSURE: 133 MMHG

## 2020-09-24 DIAGNOSIS — S52.532D CLOSED COLLES' FRACTURE OF LEFT RADIUS WITH ROUTINE HEALING, SUBSEQUENT ENCOUNTER: ICD-10-CM

## 2020-09-24 DIAGNOSIS — S52.532D CLOSED COLLES' FRACTURE OF LEFT RADIUS WITH ROUTINE HEALING, SUBSEQUENT ENCOUNTER: Primary | ICD-10-CM

## 2020-09-24 PROCEDURE — 99213 OFFICE O/P EST LOW 20 MIN: CPT | Performed by: SURGERY

## 2020-09-24 PROCEDURE — 73110 X-RAY EXAM OF WRIST: CPT

## 2020-09-24 PROCEDURE — 29075 APPL CST ELBW FNGR SHORT ARM: CPT | Performed by: SURGERY

## 2020-09-24 NOTE — PROGRESS NOTES
ASSESSMENT/PLAN:      77 y o  female with a left distal radius fracture, DOI 09/07/2020  Short arm cast was removed today and repeat x-ray's were obtained  It was discussed today that her fracture is settling  She will likely experience pian with supination, pronation as well as ulnar deviation  She will also have a prominent ulnar head and will have decreased wrist motion  Angelic Rodriguez understands with the complications she may have once her fracture is healed, she does not wish to proceed with surgical intervention  She was placed into a short arm cast  She will be casted for aprox  3 more week and she will be transitioned into a removal splint and OT will be initiated  Follow up in 3 weeks time for cast removal and repeat left wrist x-ray's  The patient verbalized understanding of exam findings and treatment plan  We engaged in the shared decision-making process and treatment options were discussed at length with the patient  Surgical and conservative management discussed today along with risks and benefits  Diagnoses and all orders for this visit:    Closed Colles' fracture of left radius with routine healing, subsequent encounter  -     XR wrist 3+ vw left; Future  -     Cast application      Follow Up:  Return in about 3 weeks (around 10/15/2020)  To Do Next Visit:  Re-evaluation of current issue, X-rays of the  left  wrist and Cast/splint off prior to x-ray    ____________________________________________________________________________________________________________________________________________      CHIEF COMPLAINT:  Chief Complaint   Patient presents with    Left Wrist - Fracture       SUBJECTIVE:  Hayde Clemente is a 77y o  year old RHD female who presents to the office today for a follow up regarding a left wrist fracture, DOI 09/07/2020  Angelic Rodriguez has been immobilized in a short arm cast as her fracture is being treated non operatively  She has tolerated the cast well   Angelic Rodriguez denies any pain today but notes that her wrist does feel "weird"  I have personally reviewed all the relevant PMH, PSH, SH, FH, Medications and allergies  PAST MEDICAL HISTORY:  Past Medical History:   Diagnosis Date    Arthritis     Bulging lumbar disc     History of blood clots     "behind right knee"    History of palpitations     History of pneumonia     "several times"    Hypertension     Low back pain     Neuropathy     peripheral    Use of cane as ambulatory aid     Wears glasses        PAST SURGICAL HISTORY:  Past Surgical History:   Procedure Laterality Date    KNEE ARTHROSCOPY Left     meniscal repair 2011    [place] KNEE SCOPE,SHAVE ARTICULAR CART Right 2/28/2017    Procedure: ARTHROSCOPY KNEE WITH PARTIAL MEDIAL MENISECTOMY;  Surgeon: Truett Burkitt, MD;  Location: [facility];  Service: Orthopedics    TONSILLECTOMY      WISDOM TOOTH EXTRACTION         FAMILY HISTORY:  History reviewed  No pertinent family history      SOCIAL HISTORY:  Social History     Tobacco Use    Smoking status: Never Smoker    Smokeless tobacco: Never Used   Substance Use Topics    Alcohol use: No    Drug use: No       MEDICATIONS:    Current Outpatient Medications:     Calcium Citrate-Vitamin D (CITRACAL + D PO), Take by mouth daily, Disp: , Rfl:     Coenzyme Q10 (COQ10) 100 MG CAPS, Take by mouth daily, Disp: , Rfl:     lisinopril (ZESTRIL) 10 mg tablet, Take 10 mg by mouth daily, Disp: , Rfl:     Multiple Vitamins-Minerals (CENTRUM PO), Take by mouth daily, Disp: , Rfl:     Omega-3 Fatty Acids (FISH OIL) 1,000 mg, Take 1,000 mg by mouth daily, Disp: , Rfl:     apixaban (ELIQUIS) 5 mg, Take 1 tablet by mouth 2 (two) times a day (Patient not taking: Reported on 9/17/2020), Disp: 60 tablet, Rfl: 0    ALLERGIES:  Allergies   Allergen Reactions    Epinephrine Other (See Comments)     "at dentist office ,,heart raced and lightheaded"       REVIEW OF SYSTEMS:  Review of Systems   Constitutional: Negative for chills, fever and unexpected weight change  HENT: Negative for hearing loss, nosebleeds and sore throat  Eyes: Negative for pain, redness and visual disturbance  Respiratory: Negative for cough, shortness of breath and wheezing  Cardiovascular: Negative for chest pain, palpitations and leg swelling  Gastrointestinal: Negative for abdominal pain, nausea and vomiting  Endocrine: Negative for polydipsia and polyuria  Genitourinary: Negative for difficulty urinating and hematuria  Musculoskeletal: Negative for arthralgias, joint swelling and myalgias  Skin: Negative for rash and wound  Neurological: Negative for dizziness, numbness and headaches  Psychiatric/Behavioral: Negative for decreased concentration, dysphoric mood and suicidal ideas  The patient is not nervous/anxious  VITALS:  Vitals:    09/24/20 1339   BP: 133/81   Pulse: 94   Temp: 98 4 °F (36 9 °C)       LABS:  HgA1c: No results found for: HGBA1C  BMP: No results found for: GLUCOSE, CALCIUM, NA, K, CO2, CL, BUN, CREATININE    _____________________________________________________  PHYSICAL EXAMINATION:  General: well developed and well nourished, alert, oriented times 3 and appears comfortable  Psychiatric: Normal  HEENT: Normocephalic, Atraumatic Trachea Midline, No torticollis  Pulmonary: No audible wheezing or respiratory distress   Cardiovascular: No pitting edema, 2+ radial pulse   Skin: No masses, erythema, lacerations, fluctation, ulcerations  Neurovascular: Sensation Intact to the Median, Ulnar, Radial Nerve, Motor Intact to the Median, Ulnar, Radial Nerve and Pulses Intact  Musculoskeletal: Normal, except as noted in detailed exam and in HPI        MUSCULOSKELETAL EXAMINATION:    Left wrist:     No erythema or ecchymosis   Resolving dorsal ecchymosis  Tender to palpation over fracture site   ROM not tested due to fracture   Full composite fist   2+ radial pulse      ___________________________________________________  STUDIES REVIEWED:  I have personally reviewed AP lateral and oblique radiographs of left wrist demonstrates persistent left distal radius fracture increased dorsal angulation and loss of radial height now is slightly ulnar positive          PROCEDURES PERFORMED:  Cast application    Date/Time: 9/24/2020 2:06 PM  Performed by: Lexus Pandey MD  Authorized by: Lexus Pandey MD     Consent:     Consent obtained:  Verbal    Consent given by:  Patient  Pre-procedure details:     Sensation:  Normal  Procedure details:     Laterality:  Left    Location:  Wrist    Wrist:  L wrist    Strapping: no  Cast type:  Short arm    Supplies:  Cotton padding and fiberglass  Post-procedure details:     Sensation:  Normal    Patient tolerance of procedure:   Tolerated well, no immediate complications         _____________________________________________________      Duyen Age    I,:   Maryjane Wright am acting as a scribe while in the presence of the attending physician :        I,:   Lexus Pandey MD personally performed the services described in this documentation    as scribed in my presence :

## 2020-10-15 ENCOUNTER — APPOINTMENT (OUTPATIENT)
Dept: RADIOLOGY | Facility: AMBULARY SURGERY CENTER | Age: 65
End: 2020-10-15
Attending: SURGERY
Payer: MEDICARE

## 2020-10-15 ENCOUNTER — OFFICE VISIT (OUTPATIENT)
Dept: OBGYN CLINIC | Facility: CLINIC | Age: 65
End: 2020-10-15
Payer: MEDICARE

## 2020-10-15 VITALS
BODY MASS INDEX: 40.82 KG/M2 | DIASTOLIC BLOOD PRESSURE: 87 MMHG | HEART RATE: 83 BPM | SYSTOLIC BLOOD PRESSURE: 137 MMHG | WEIGHT: 245 LBS | HEIGHT: 65 IN

## 2020-10-15 DIAGNOSIS — S52.532D CLOSED COLLES' FRACTURE OF LEFT RADIUS WITH ROUTINE HEALING, SUBSEQUENT ENCOUNTER: ICD-10-CM

## 2020-10-15 DIAGNOSIS — S52.532D CLOSED COLLES' FRACTURE OF LEFT RADIUS WITH ROUTINE HEALING, SUBSEQUENT ENCOUNTER: Primary | ICD-10-CM

## 2020-10-15 PROCEDURE — 73110 X-RAY EXAM OF WRIST: CPT

## 2020-10-15 PROCEDURE — 99213 OFFICE O/P EST LOW 20 MIN: CPT | Performed by: SURGERY

## 2020-11-02 ENCOUNTER — EVALUATION (OUTPATIENT)
Dept: OCCUPATIONAL THERAPY | Facility: CLINIC | Age: 65
End: 2020-11-02
Payer: MEDICARE

## 2020-11-02 DIAGNOSIS — S52.532D CLOSED COLLES' FRACTURE OF LEFT RADIUS WITH ROUTINE HEALING, SUBSEQUENT ENCOUNTER: ICD-10-CM

## 2020-11-02 PROCEDURE — 97165 OT EVAL LOW COMPLEX 30 MIN: CPT | Performed by: OCCUPATIONAL THERAPIST

## 2020-11-02 PROCEDURE — 97110 THERAPEUTIC EXERCISES: CPT | Performed by: OCCUPATIONAL THERAPIST

## (undated) DEVICE — SUT ETHILON 3-0 PS-1 18 IN 1663G

## (undated) DEVICE — COBAN 6 IN STERILE

## (undated) DEVICE — PACK UNIVERSAL ARTHRSCOPY PBDS

## (undated) DEVICE — PADDING CAST 6IN COTTON STRL

## (undated) DEVICE — SYRINGE 30ML LL

## (undated) DEVICE — OCCLUSIVE GAUZE STRIP,3% BISMUTH TRIBROMOPHENATE IN PETROLATUM BLEND: Brand: XEROFORM

## (undated) DEVICE — 3M™ STERI-DRAPE™ U-DRAPE 1015: Brand: STERI-DRAPE™

## (undated) DEVICE — GLOVE INDICATOR PI UNDERGLOVE SZ 8 BLUE

## (undated) DEVICE — INTENDED FOR TISSUE SEPARATION, AND OTHER PROCEDURES THAT REQUIRE A SHARP SURGICAL BLADE TO PUNCTURE OR CUT.: Brand: BARD-PARKER ® CARBON RIB-BACK BLADES

## (undated) DEVICE — CHLORAPREP HI-LITE 26ML ORANGE

## (undated) DEVICE — NEEDLE 22 G X 1 1/2 SAFETY

## (undated) DEVICE — STERLING XTRASHARP SHAVER GATOR SHAVER BLADE, 4.2 MM: Brand: STERLING XTRASHARP SHAVER GATOR

## (undated) DEVICE — IMPERVIOUS STOCKINETTE: Brand: DEROYAL

## (undated) DEVICE — GLOVE SRG BIOGEL 8.5

## (undated) DEVICE — 3000CC GUARDIAN II: Brand: GUARDIAN

## (undated) DEVICE — WEBRIL 6 IN UNSTERILE

## (undated) DEVICE — CUFF TOURNIQUET 30 X 4 IN QUICK CONNECT DISP 1BLA

## (undated) DEVICE — ACE WRAP 6 IN UNSTERILE

## (undated) DEVICE — ABDOMINAL PAD: Brand: DERMACEA